# Patient Record
Sex: MALE | Race: WHITE | NOT HISPANIC OR LATINO | URBAN - METROPOLITAN AREA
[De-identification: names, ages, dates, MRNs, and addresses within clinical notes are randomized per-mention and may not be internally consistent; named-entity substitution may affect disease eponyms.]

---

## 2024-01-03 ENCOUNTER — APPOINTMENT (RX ONLY)
Dept: URBAN - METROPOLITAN AREA CLINIC 149 | Facility: CLINIC | Age: 69
Setting detail: DERMATOLOGY
End: 2024-01-03

## 2024-01-03 DIAGNOSIS — L57.0 ACTINIC KERATOSIS: ICD-10-CM

## 2024-01-03 DIAGNOSIS — Z71.89 OTHER SPECIFIED COUNSELING: ICD-10-CM

## 2024-01-03 DIAGNOSIS — L82.0 INFLAMED SEBORRHEIC KERATOSIS: ICD-10-CM

## 2024-01-03 DIAGNOSIS — D485 NEOPLASM OF UNCERTAIN BEHAVIOR OF SKIN: ICD-10-CM | Status: INADEQUATELY CONTROLLED

## 2024-01-03 PROBLEM — D48.5 NEOPLASM OF UNCERTAIN BEHAVIOR OF SKIN: Status: ACTIVE | Noted: 2024-01-03

## 2024-01-03 PROCEDURE — ? BENIGN DESTRUCTION

## 2024-01-03 PROCEDURE — 17000 DESTRUCT PREMALG LESION: CPT | Mod: 59

## 2024-01-03 PROCEDURE — 99202 OFFICE O/P NEW SF 15 MIN: CPT | Mod: 25

## 2024-01-03 PROCEDURE — ? DIAGNOSIS COMMENT

## 2024-01-03 PROCEDURE — ? PREMALIGNANT DESTRUCTION

## 2024-01-03 PROCEDURE — ? SHAVE REMOVAL

## 2024-01-03 PROCEDURE — 11311 SHAVE SKIN LESION 0.6-1.0 CM: CPT

## 2024-01-03 PROCEDURE — ? TREATMENT REGIMEN

## 2024-01-03 PROCEDURE — 17110 DESTRUCTION B9 LES UP TO 14: CPT | Mod: 59

## 2024-01-03 PROCEDURE — ? EDUCATIONAL RESOURCES PROVIDED

## 2024-01-03 PROCEDURE — ? COUNSELING

## 2024-01-03 ASSESSMENT — LOCATION DETAILED DESCRIPTION DERM
LOCATION DETAILED: LEFT SUPERIOR CENTRAL MALAR CHEEK
LOCATION DETAILED: LEFT CENTRAL MALAR CHEEK
LOCATION DETAILED: LEFT MID TEMPLE

## 2024-01-03 ASSESSMENT — LOCATION ZONE DERM: LOCATION ZONE: FACE

## 2024-01-03 ASSESSMENT — LOCATION SIMPLE DESCRIPTION DERM
LOCATION SIMPLE: LEFT CHEEK
LOCATION SIMPLE: LEFT TEMPLE

## 2024-01-03 NOTE — PROCEDURE: SHAVE REMOVAL
Hide Shave Removal Depth?: No
Was A Bandage Applied: Yes
Biopsy Method: 10 blade
Size Of Lesion In Cm (Required): 0.8
Depth Of Shave: dermis
Post-Care Instructions: -\\n-\\nPost op care was discussed and includes cleaning the area with soap and water and then rinsing the area with a tablespoon of table vinegar in a cup of tap water. Pat dry and then apply Polysporin ointment if available and petrolatum if not. Cover with bandage if the area is likely to get dirty or rubbed. Do this until healed or 10 days. Call if not seeming to heal after 10 days or the area looks infected. Should the patient develop any fevers, chills, bleeding, severe pain patient will contact the office immediately or go to your regular doctor, urgent care or the ER.-\\n-
Consent was obtained from the patient. The risks and benefits to therapy were discussed in detail. Specifically, the risks of infection, scarring, bleeding, prolonged wound healing, incomplete removal, allergy to anesthesia, and recurrence were addressed. Prior to the procedure, the treatment site was clearly identified and confirmed by the patient. All components of Universal Protocol/PAUSE Rule completed.
Detail Level: Detailed
Wound Care: Petrolatum
X Size Of Lesion In Cm (Optional): 0
Medical Necessity Information: It is in your best interest to select a reason for this procedure from the list below. All of these items fulfill various CMS LCD requirements except the new and changing color options.
Anesthesia Type: 1% lidocaine with epinephrine
Billing Type: Third-Party Bill
Notification Instructions: Patient will be notified of pathology results. However, patient instructed to call the office if not contacted within 2 weeks.
Lab: -93
Anesthesia Volume In Cc: 0.5
Medical Necessity Clause: This procedure was medically necessary because the lesion that was treated was: The exact behavior of this lesion is in question.
Hemostasis: TCA 20%

## 2024-01-03 NOTE — PROCEDURE: BENIGN DESTRUCTION
Detail Level: Zone
Post-Care Instructions: I reviewed with the patient in detail post-care instructions. Patient is to wear sunprotection, and avoid picking at any of the treated lesions. Pt may apply Vaseline to crusted or scabbing areas.
Medical Necessity Information: It is in your best interest to select a reason for this procedure from the list below. All of these items fulfill various CMS LCD requirements except the new and changing color options.
Add 52 Modifier (Optional): no
Include Z78.9 (Other Specified Conditions Influencing Health Status) As An Associated Diagnosis?: Yes
Total Number Of Lesions Treated: 1
Medical Necessity Clause: This procedure was medically necessary because the lesions that were treated were: uncomfortable and not getting better.
Consent: The patient's consent was obtained including but not limited to risks of crusting, scabbing, blistering, scarring, darker or lighter pigmentary change, recurrence, incomplete removal and infection.

## 2024-01-03 NOTE — PROCEDURE: PREMALIGNANT DESTRUCTION
Consent: The patient's consent was obtained including but not limited to risks of crusting, scabbing, blistering, scarring, darker or lighter pigmentary change, recurrence, incomplete removal and infection.
Total Number Of Aks Treated: 1
Detail Level: Zone
Render In Bullet Format When Appropriate: No
Method: liquid nitrogen
Post-Care Instructions: I reviewed with the patient in detail post-care instructions. Patient is to wear sunprotection, and avoid picking at any of the treated lesions. Pt may apply Vaseline to crusted or scabbing areas.
Post-Procedure Care (Optional): The patient received detailed post-op instructions.

## 2024-04-04 ENCOUNTER — APPOINTMENT (RX ONLY)
Dept: URBAN - METROPOLITAN AREA CLINIC 149 | Facility: CLINIC | Age: 69
Setting detail: DERMATOLOGY
End: 2024-04-04

## 2024-04-04 DIAGNOSIS — Z71.89 OTHER SPECIFIED COUNSELING: ICD-10-CM

## 2024-04-04 DIAGNOSIS — L72.0 EPIDERMAL CYST: ICD-10-CM

## 2024-04-04 DIAGNOSIS — D485 NEOPLASM OF UNCERTAIN BEHAVIOR OF SKIN: ICD-10-CM | Status: INADEQUATELY CONTROLLED

## 2024-04-04 PROBLEM — D48.5 NEOPLASM OF UNCERTAIN BEHAVIOR OF SKIN: Status: ACTIVE | Noted: 2024-04-04

## 2024-04-04 PROCEDURE — 17281 DSTR MAL LS F/E/E/N/L/M .6-1: CPT

## 2024-04-04 PROCEDURE — ? EDUCATIONAL RESOURCES PROVIDED

## 2024-04-04 PROCEDURE — ? COUNSELING

## 2024-04-04 PROCEDURE — ? PRESCRIPTION MEDICATION MANAGEMENT

## 2024-04-04 PROCEDURE — 99212 OFFICE O/P EST SF 10 MIN: CPT | Mod: 25

## 2024-04-04 PROCEDURE — ? SHAVE REMOVAL AND DESTRUCTION

## 2024-04-04 PROCEDURE — ? PHOTO-DOCUMENTATION

## 2024-04-04 PROCEDURE — ? TREATMENT REGIMEN

## 2024-04-04 PROCEDURE — ? DIAGNOSIS COMMENT

## 2024-04-04 ASSESSMENT — LOCATION DETAILED DESCRIPTION DERM
LOCATION DETAILED: RIGHT SUPERIOR UPPER BACK
LOCATION DETAILED: LEFT CENTRAL MALAR CHEEK

## 2024-04-04 ASSESSMENT — LOCATION SIMPLE DESCRIPTION DERM
LOCATION SIMPLE: LEFT CHEEK
LOCATION SIMPLE: RIGHT UPPER BACK

## 2024-04-04 ASSESSMENT — LOCATION ZONE DERM
LOCATION ZONE: FACE
LOCATION ZONE: TRUNK

## 2024-04-04 NOTE — PROCEDURE: SHAVE REMOVAL AND DESTRUCTION
Dressing: dry sterile dressing
Render Path Notes In Note?: No
Size Of Lesion In Cm: 0
Bill As?: Note: Bill Malignant Destruction If Path Confirms Malignant Lesion. Only Bill As Shave Removal If Path Comes Back Benign. Do Not Bill Shave Removal On Malignant Lesions.: Malignant Destruction
Consent: Written consent was obtained and risks were reviewed including but not limited to scarring, infection, bleeding, scabbing, incomplete removal, and allergy to anesthesia.
Wound Care: Petrolatum
Billing Type: Third-Party Bill
Hemostasis: TCA 20%
Post-Care Instructions: -\\n-\\nPost op care was discussed and includes cleaning the area with soap and water and then rinsing the area with a tablespoon of table vinegar in a cup of tap water. Pat dry and then apply Polysporin ointment if available and petrolatum if not. Cover with bandage if the area is likely to get dirty or rubbed. Do this until healed or 10 days. Call if not seeming to heal after 10 days or the area looks infected. Should the patient develop any fevers, chills, bleeding, severe pain patient will contact the office immediately or go to your regular doctor, urgent care or the ER.-\\n-
Anesthesia Type: 1% lidocaine with epinephrine
Cautery Type: drysol
Lab: -93
Number Of Curettages: 2
Anesthesia Volume In Cc: 1
Size After Destruction (Required For Destruction Billing): 0.8
Detail Level: Detailed
Notification Instructions: Patient will be notified of biopsy results. However, patient instructed to call the office if not contacted within 2 weeks.
Was Curettage Performed?: Yes

## 2024-04-04 NOTE — PROCEDURE: DIAGNOSIS COMMENT
Detail Level: Zone
Render Risk Assessment In Note?: no
Comment: -------------------\\nPatient will be referred to surgery to have cyst removed

## 2024-04-04 NOTE — PROCEDURE: MIPS QUALITY
Detail Level: Generalized
Quality 130: Documentation Of Current Medications In The Medical Record: Current Medications Documented
Quality 226: Preventive Care And Screening: Tobacco Use: Screening And Cessation Intervention: Tobacco Screening not Performed

## 2024-04-30 ENCOUNTER — APPOINTMENT (RX ONLY)
Dept: URBAN - METROPOLITAN AREA CLINIC 149 | Facility: CLINIC | Age: 69
Setting detail: DERMATOLOGY
End: 2024-04-30

## 2024-04-30 VITALS — HEIGHT: 69 IN | WEIGHT: 173 LBS

## 2024-04-30 DIAGNOSIS — L72.8 OTHER FOLLICULAR CYSTS OF THE SKIN AND SUBCUTANEOUS TISSUE: ICD-10-CM

## 2024-04-30 PROCEDURE — 13101 CMPLX RPR TRUNK 2.6-7.5 CM: CPT

## 2024-04-30 PROCEDURE — 11402 EXC TR-EXT B9+MARG 1.1-2 CM: CPT

## 2024-04-30 PROCEDURE — ? EXCISION

## 2024-04-30 PROCEDURE — ? PRESCRIPTION

## 2024-04-30 RX ORDER — DOXYCYCLINE HYCLATE 100 MG/1
CAPSULE, GELATIN COATED ORAL BID
Qty: 10 | Refills: 0 | Status: ERX | COMMUNITY
Start: 2024-04-30

## 2024-04-30 RX ADMIN — DOXYCYCLINE HYCLATE: 100 CAPSULE, GELATIN COATED ORAL at 00:00

## 2024-04-30 ASSESSMENT — LOCATION DETAILED DESCRIPTION DERM: LOCATION DETAILED: RIGHT SUPERIOR UPPER BACK

## 2024-04-30 ASSESSMENT — LOCATION ZONE DERM: LOCATION ZONE: TRUNK

## 2024-04-30 ASSESSMENT — LOCATION SIMPLE DESCRIPTION DERM: LOCATION SIMPLE: RIGHT UPPER BACK

## 2024-04-30 NOTE — PROCEDURE: EXCISION

## 2024-05-14 ENCOUNTER — APPOINTMENT (RX ONLY)
Dept: URBAN - METROPOLITAN AREA CLINIC 149 | Facility: CLINIC | Age: 69
Setting detail: DERMATOLOGY
End: 2024-05-14

## 2024-05-14 DIAGNOSIS — Z48.817 ENCOUNTER FOR SURGICAL AFTERCARE FOLLOWING SURGERY ON THE SKIN AND SUBCUTANEOUS TISSUE: ICD-10-CM

## 2024-05-14 PROCEDURE — ? SUTURE REMOVAL (GLOBAL PERIOD)

## 2024-05-14 ASSESSMENT — LOCATION ZONE DERM: LOCATION ZONE: TRUNK

## 2024-05-14 ASSESSMENT — LOCATION DETAILED DESCRIPTION DERM: LOCATION DETAILED: RIGHT MID-UPPER BACK

## 2024-05-14 ASSESSMENT — LOCATION SIMPLE DESCRIPTION DERM: LOCATION SIMPLE: RIGHT UPPER BACK

## 2024-05-14 NOTE — PROCEDURE: SUTURE REMOVAL (GLOBAL PERIOD)
Body Location Override (Optional - Billing Will Still Be Based On Selected Body Map Location If Applicable): right superior upper back
Detail Level: Detailed
Add 60603 Cpt? (Important Note: In 2017 The Use Of 56816 Is Being Tracked By Cms To Determine Future Global Period Reimbursement For Global Periods): no

## 2024-10-08 ENCOUNTER — APPOINTMENT (RX ONLY)
Dept: URBAN - METROPOLITAN AREA CLINIC 149 | Facility: CLINIC | Age: 69
Setting detail: DERMATOLOGY
End: 2024-10-08

## 2024-10-08 DIAGNOSIS — L91.0 HYPERTROPHIC SCAR: ICD-10-CM

## 2024-10-08 DIAGNOSIS — L71.8 OTHER ROSACEA: ICD-10-CM

## 2024-10-08 DIAGNOSIS — L72.0 EPIDERMAL CYST: ICD-10-CM

## 2024-10-08 PROCEDURE — ? PRESCRIPTION

## 2024-10-08 PROCEDURE — ? COUNSELING

## 2024-10-08 PROCEDURE — 99213 OFFICE O/P EST LOW 20 MIN: CPT

## 2024-10-08 RX ORDER — METRONIDAZOLE 7.5 MG/G
CREAM TOPICAL BID
Qty: 45 | Refills: 2 | Status: ERX | COMMUNITY
Start: 2024-10-08

## 2024-10-08 RX ADMIN — METRONIDAZOLE: 7.5 CREAM TOPICAL at 00:00

## 2024-10-08 ASSESSMENT — LOCATION SIMPLE DESCRIPTION DERM
LOCATION SIMPLE: POSTERIOR NECK
LOCATION SIMPLE: RIGHT UPPER BACK

## 2024-10-08 ASSESSMENT — LOCATION DETAILED DESCRIPTION DERM
LOCATION DETAILED: RIGHT SUPERIOR UPPER BACK
LOCATION DETAILED: RIGHT MEDIAL TRAPEZIAL NECK

## 2024-10-08 ASSESSMENT — LOCATION ZONE DERM
LOCATION ZONE: NECK
LOCATION ZONE: TRUNK

## 2024-12-10 ENCOUNTER — APPOINTMENT (OUTPATIENT)
Dept: URBAN - METROPOLITAN AREA CLINIC 149 | Facility: CLINIC | Age: 69
Setting detail: DERMATOLOGY
End: 2024-12-10

## 2024-12-10 DIAGNOSIS — L71.8 OTHER ROSACEA: ICD-10-CM | Status: STABLE

## 2024-12-10 DIAGNOSIS — L82.0 INFLAMED SEBORRHEIC KERATOSIS: ICD-10-CM

## 2024-12-10 PROCEDURE — ? PRESCRIPTION MEDICATION MANAGEMENT

## 2024-12-10 PROCEDURE — 99213 OFFICE O/P EST LOW 20 MIN: CPT | Mod: 25

## 2024-12-10 PROCEDURE — ? LIQUID NITROGEN

## 2024-12-10 PROCEDURE — ? COUNSELING

## 2024-12-10 PROCEDURE — 17110 DESTRUCTION B9 LES UP TO 14: CPT

## 2024-12-10 ASSESSMENT — SEVERITY ASSESSMENT OVERALL AMONG ALL PATIENTS: IN YOUR EXPERIENCE, AMONG ALL PATIENTS YOU HAVE SEEN WITH THIS CONDITION, HOW SEVERE IS THIS PATIENT'S CONDITION?: MILD

## 2024-12-10 ASSESSMENT — LOCATION DETAILED DESCRIPTION DERM
LOCATION DETAILED: LEFT DISTAL ULNAR DORSAL FOREARM
LOCATION DETAILED: LEFT CENTRAL MALAR CHEEK

## 2024-12-10 ASSESSMENT — LOCATION ZONE DERM
LOCATION ZONE: ARM
LOCATION ZONE: FACE

## 2024-12-10 ASSESSMENT — LOCATION SIMPLE DESCRIPTION DERM
LOCATION SIMPLE: LEFT CHEEK
LOCATION SIMPLE: LEFT FOREARM

## 2024-12-10 NOTE — PROCEDURE: LIQUID NITROGEN
Show Topical Anesthesia Variable?: Yes
Post-Care Instructions: I reviewed with the patient in detail post-care instructions. Patient is to wear sunprotection, and avoid picking at any of the treated lesions. Pt may apply Vaseline to crusted or scabbing areas.
Number Of Freeze-Thaw Cycles: 1 freeze-thaw cycle
Spray Paint Text: The liquid nitrogen was applied to the skin utilizing a spray paint frosting technique.
Duration Of Freeze Thaw-Cycle (Seconds): 5-10
Render Post-Care Instructions In Note?: no
Application Tool (Optional): Liquid Nitrogen Sprayer
Medical Necessity Information: It is in your best interest to select a reason for this procedure from the list below. All of these items fulfill various CMS LCD requirements except the new and changing color options.
Medical Necessity Clause: This procedure was medically necessary because the lesions that were treated were:
Consent: The patient's consent was obtained including but not limited to risks of crusting, scabbing, blistering, scarring, darker or lighter pigmentary change, recurrence, incomplete removal and infection.
Detail Level: Simple

## 2025-06-10 ENCOUNTER — APPOINTMENT (OUTPATIENT)
Dept: URBAN - METROPOLITAN AREA CLINIC 149 | Facility: CLINIC | Age: 70
Setting detail: DERMATOLOGY
End: 2025-06-10

## 2025-06-10 DIAGNOSIS — Z71.89 OTHER SPECIFIED COUNSELING: ICD-10-CM

## 2025-06-10 DIAGNOSIS — L82.0 INFLAMED SEBORRHEIC KERATOSIS: ICD-10-CM

## 2025-06-10 DIAGNOSIS — L57.0 ACTINIC KERATOSIS: ICD-10-CM

## 2025-06-10 DIAGNOSIS — R23.8 OTHER SKIN CHANGES: ICD-10-CM

## 2025-06-10 DIAGNOSIS — L71.8 OTHER ROSACEA: ICD-10-CM

## 2025-06-10 DIAGNOSIS — L57.8 OTHER SKIN CHANGES DUE TO CHRONIC EXPOSURE TO NONIONIZING RADIATION: ICD-10-CM | Status: UNCHANGED

## 2025-06-10 PROCEDURE — ? ADDITIONAL NOTES

## 2025-06-10 PROCEDURE — ?: Mod: 59

## 2025-06-10 PROCEDURE — ? COUNSELING

## 2025-06-10 PROCEDURE — ? PREMALIGNANT DESTRUCTION

## 2025-06-10 PROCEDURE — ? PRESCRIPTION MEDICATION MANAGEMENT

## 2025-06-10 PROCEDURE — ? TREATMENT REGIMEN

## 2025-06-10 PROCEDURE — ?: Mod: 25

## 2025-06-10 PROCEDURE — ? PRESCRIPTION

## 2025-06-10 PROCEDURE — ? PATIENT SPECIFIC COUNSELING

## 2025-06-10 PROCEDURE — ? DIAGNOSIS COMMENT

## 2025-06-10 PROCEDURE — ?

## 2025-06-10 PROCEDURE — ? BENIGN DESTRUCTION

## 2025-06-10 PROCEDURE — ? OTHER

## 2025-06-10 RX ORDER — METRONIDAZOLE 7.5 MG/G
CREAM TOPICAL
Qty: 45 | Refills: 3 | Status: ERX | COMMUNITY
Start: 2025-06-10

## 2025-06-10 RX ADMIN — METRONIDAZOLE: 7.5 CREAM TOPICAL at 00:00

## 2025-06-10 ASSESSMENT — LOCATION ZONE DERM
LOCATION ZONE: EAR
LOCATION ZONE: HAND

## 2025-06-10 ASSESSMENT — LOCATION SIMPLE DESCRIPTION DERM
LOCATION SIMPLE: LEFT EAR
LOCATION SIMPLE: LEFT HAND

## 2025-06-10 ASSESSMENT — LOCATION DETAILED DESCRIPTION DERM
LOCATION DETAILED: LEFT ULNAR DORSAL HAND
LOCATION DETAILED: LEFT CYMBA CONCHA

## 2025-06-10 NOTE — PROCEDURE: ADDITIONAL NOTES
Additional Notes: 2 Other Skin Changes R23.8 No MDM, Mohit\\n%%%%%%%%%%%%%%%%%%%%%%%%%%%%%%%%%%%%%%%%%%%%%%%%%\\nDr. Mohit uses this area to make notes he feels most pertinent to today's visit.  Border by %%%%%%%%%%%%%%.\\nDr. Winsomesheila saw this patient as the primary provider. His additional comments are here\\nProblems of greater concern today and skin cancer issues are listed in this section.\\nSee the scanned/photo’d copies of OV note (paper for today & highlighted portion of printed for last visit), HO and instructions for today under EMA photos tab.  Additions to that are added here unless there are none and then only ltp appears.\\n# # # # # # # # # # # # # # # # # # # # # # # # # # # # # # # # # # # # # # # # # # # # # # # # # # # # # # # # # # # # # # # # # # # # # # # # # # # # \\nCryo of ISK on ear that is tender and 5 AK on left dorsal hand\\nWe discussed sun damage and AK. I encourage protection with sunscreen (Solbar is best to me) and clothing, use of B3 & Ammonium Lactate, Instructions & HO given\\n\\nThe patient asked about SK and how to treat them.  They will use Ammonium Lactate and Urea 40% + Sal Acid 2% per oral and HO instructions.\\n\\n\\n\\n# # # # # # # # # # # # # # # # # # # # # # # # # # # # # # # # # # # # # # # # # # # # # # # # # # # # # # # # # # # # # # # # # # # # # # # # # # # # \\n\\nIf yes occurs at the end of this line at least one of their problems is chronic. Yes\\nIf yes occurs at the end of this line the patient only wanted areas they chose to be examined and treated.\\nFor Info about Chronic Actinic (Sun) Damage (L56.8): See below under @@@@@@\\nFor Info about Actinic Keratoses Level: See below under @@@@@@@\\Ming Rueda MD FAAD\\n%%%%%%%%%%%%%%%%%%%%%%%%%%%%%%%%%%%%%%%%%%%%%%%%%%	-\\nWhen the patient's last name followed by the visit date appears in the note, this means this written or typed instruction between this visit date and the Return date below this was printed and given to the patient.\\n\\n@@@@@@@@@ Underneath This Line may be listed Additional Problems and Exam Notes  \\n\\n\\Ming Rueda MD FAAD\\n\\[id number]\\n>>>>>>>>>>>>>>>>>>>>>>>>>>>>>>>>>>>>>>>>>>>>>>>>>>>>>>>>>>>>>>>>>>
Detail Level: Generalized
Render Risk Assessment In Note?: no

## 2025-06-10 NOTE — PROCEDURE: DIAGNOSIS COMMENT
Detail Level: Generalized
Render Risk Assessment In Note?: no
Detail Level: Zone
Comment: This is a chronic disease in most of our patient.  You can stabilize but usually not cure.

## 2025-06-10 NOTE — PROCEDURE: TREATMENT REGIMEN
Initiate Treatment: We may outline other treatment but as a minimum\\nClean your rash areas with suggested cleanser or soap at least once per day before applying medicine. AM and PM washing is best.\\nSuggested cleanser or soap is(are)\\nGentle soap such as Dove unscented, CeraVe Cleanser, or Cetaphil Cleanser\\nThe apply\\nAM and PM\\nCeraVe Cream or Kaylan cream OTC over any prescription creams given AM & PM, CeraVe is preferable\\nAvoid spicy food, thermally hot food, wine, extremely hot or cold environments \\Santi needed for itch Sarna original lotion OTC.
Detail Level: Detailed
Plan: Medication Management for problems addressed today with medication is listed here and/or are under patient photos where the treatment instructions were scanned or photo uploaded. If there is an image of treatment information sheet, typed instructions, or written instructions for today date in the photo section it was given to the patient.  We recommend as  a minimum sunscreen and protective clothing.  Our favorite sunscreens are posted in the exam room for discussion.
Plan: The patient was told that sun protection with sunscreen and protective clothing should be used lifelong.\\n\\n# # # # # # # # # # # # # # # # # # # # # # # # # # # # # # # # # # # # # # # # # # # #\\n\\n\\n\\n# # # # # # # # # # # # # # # # # # # # # # # # # # # # # # # # # # # # # # # # # # # #.

## 2025-06-10 NOTE — PROCEDURE: BENIGN DESTRUCTION
Medical Necessity Information: It is in your best interest to select a reason for this procedure from the list below. All of these items fulfill various CMS LCD requirements except the new and changing color options.
Include Z78.9 (Other Specified Conditions Influencing Health Status) As An Associated Diagnosis?: Yes
Total Number Of Lesions Treated: 1
Detail Level: Zone
Render Note In Bullet Format When Appropriate: No
Post-Care Instructions: I reviewed with the patient in detail post-care instructions. Patient is to wear sunprotection, and avoid picking at any of the treated lesions. Pt may apply Vaseline to crusted or scabbing areas.
Medical Necessity Clause: This procedure was medically necessary because the lesions that were treated were: uncomfortable and not getting better.
Consent: The patient's consent was obtained including but not limited to risks of crusting, scabbing, blistering, scarring, darker or lighter pigmentary change, recurrence, incomplete removal and infection.

## 2025-06-10 NOTE — PROCEDURE: PREMALIGNANT DESTRUCTION
Consent: The patient's consent was obtained including but not limited to risks of crusting, scabbing, blistering, scarring, darker or lighter pigmentary change, recurrence, incomplete removal and infection.
Post-Procedure Care (Optional): The patient received detailed post-op instructions.
Detail Level: Zone
Total Number Of Aks Treated: 5
Method: liquid nitrogen
Post-Care Instructions: I reviewed with the patient in detail post-care instructions. Patient is to wear sunprotection, and avoid picking at any of the treated lesions. Pt may apply Vaseline to crusted or scabbing areas.
Render In Bullet Format When Appropriate: No

## 2025-06-10 NOTE — PROCEDURE: OTHER
Other (Free Text): The patient was asked whether there were any other problems to discuss and if so, they are in the general body of this note.\\nAlso, we asked if there were any areas of skin that are covered that need to be examined and if so, these are noted.\\nProbable and possible diagnoses were discussed in the exam room.\\nTreatment options and benefits of treatment for these diagnoses were discussed in the exam room.\\nThe risks of complications and morbidity of possible treatments were discussed.\\nThe lack of need for or risks of having further evaluation procedures were discussed.\\n\\nTerry Mohit MD FAAD\\n\\[id number]\\n>>>>>>>>>>>>>>>>>>>>>>>>>>>>>>>>>>>>>>>>>>>>>>>>>>>>>>>>>>>>>>>>>>\\n- - - - - - - - - - - - - - - - - - - - - - - - - - - - - - - - - - - - - - - - - - - - - - - - - - - - - - - - - - - - - - - - - - - -\\nI am now sometimes using [employer] speech recognition software for the typed info above. It is not perfect, and I have not attempted to fix every mistake as long as the intent of the statement is correct. There will be misspelled word and grammatical errors. This does allow me though to give more details. ltp\\n\\nCoding comments for Billing Staff\\nI list the diagnoses the patient has. Some diagnoses have certain symptoms and if a certain symptom may or not be caused by the other diagnoses it is listed separately. For example, xerosis can cause itch but may not cause itch. So if the person has xerosis and itch but itch could be separate from xerosis then it is listed as a diagnosis. If the  wishes to lump it in with xerosis that is their choice. If a patient has an AK then one assumes they have sun damage but the opposite is not always true. Also the patient can have sun damage in one area and AK plus sun damage in another. I usually list the amount of sun damage and the amount of AK as two separate entities since sun damage includes many factors that can be separate from the AK. Both diagnoses are separate and related. These often require separate treatments.\\n\\nSome abbreviations that might be used in this note include\\nNATT, not at treatment target\\nNER, no evidence of recurrence or problems with surgery site\\nS&D, Surgery and Destruction
Render Risk Assessment In Note?: no
Detail Level: Zone
Note Text (......Xxx Chief Complaint.): This diagnosis correlates with the